# Patient Record
Sex: FEMALE | Race: WHITE | Employment: UNEMPLOYED | URBAN - METROPOLITAN AREA
[De-identification: names, ages, dates, MRNs, and addresses within clinical notes are randomized per-mention and may not be internally consistent; named-entity substitution may affect disease eponyms.]

---

## 2022-09-08 ENCOUNTER — OFFICE VISIT (OUTPATIENT)
Dept: FAMILY MEDICINE CLINIC | Age: 32
End: 2022-09-08
Payer: COMMERCIAL

## 2022-09-08 VITALS
DIASTOLIC BLOOD PRESSURE: 76 MMHG | OXYGEN SATURATION: 99 % | SYSTOLIC BLOOD PRESSURE: 118 MMHG | BODY MASS INDEX: 30.29 KG/M2 | HEART RATE: 76 BPM | WEIGHT: 181.8 LBS | HEIGHT: 65 IN

## 2022-09-08 DIAGNOSIS — Z12.4 PAP SMEAR FOR CERVICAL CANCER SCREENING: ICD-10-CM

## 2022-09-08 DIAGNOSIS — Z13.29 SCREENING FOR THYROID DISORDER: ICD-10-CM

## 2022-09-08 DIAGNOSIS — G43.909 MIGRAINE WITHOUT STATUS MIGRAINOSUS, NOT INTRACTABLE, UNSPECIFIED MIGRAINE TYPE: ICD-10-CM

## 2022-09-08 DIAGNOSIS — Z11.59 ENCOUNTER FOR HEPATITIS C SCREENING TEST FOR LOW RISK PATIENT: ICD-10-CM

## 2022-09-08 DIAGNOSIS — Z00.00 PREVENTATIVE HEALTH CARE: Primary | ICD-10-CM

## 2022-09-08 DIAGNOSIS — F41.9 ANXIETY AND DEPRESSION: ICD-10-CM

## 2022-09-08 DIAGNOSIS — Z79.899 LONG TERM USE OF DRUG: ICD-10-CM

## 2022-09-08 DIAGNOSIS — Z11.4 ENCOUNTER FOR SCREENING FOR HUMAN IMMUNODEFICIENCY VIRUS (HIV): ICD-10-CM

## 2022-09-08 DIAGNOSIS — F32.A ANXIETY AND DEPRESSION: ICD-10-CM

## 2022-09-08 PROCEDURE — 99385 PREV VISIT NEW AGE 18-39: CPT

## 2022-09-08 PROCEDURE — 36415 COLL VENOUS BLD VENIPUNCTURE: CPT

## 2022-09-08 RX ORDER — AMITRIPTYLINE HYDROCHLORIDE 10 MG/1
10 TABLET, FILM COATED ORAL NIGHTLY
Qty: 30 TABLET | Refills: 0 | Status: SHIPPED | OUTPATIENT
Start: 2022-09-08 | End: 2022-10-06

## 2022-09-08 RX ORDER — ESCITALOPRAM OXALATE 20 MG/1
20 TABLET ORAL DAILY
COMMUNITY
End: 2022-09-08 | Stop reason: SDUPTHER

## 2022-09-08 RX ORDER — ESCITALOPRAM OXALATE 10 MG/1
10 TABLET ORAL DAILY
Qty: 30 TABLET | Refills: 0 | Status: SHIPPED | OUTPATIENT
Start: 2022-09-08 | End: 2022-10-06

## 2022-09-08 RX ORDER — CLINDAMYCIN PHOSPHATE 10 UG/ML
LOTION TOPICAL
COMMUNITY
Start: 2022-09-01

## 2022-09-08 ASSESSMENT — ENCOUNTER SYMPTOMS
COLOR CHANGE: 0
DIARRHEA: 0
CONSTIPATION: 0
BLOOD IN STOOL: 0
ABDOMINAL PAIN: 0
WHEEZING: 0
COUGH: 0
SHORTNESS OF BREATH: 0
SORE THROAT: 0

## 2022-09-08 ASSESSMENT — PATIENT HEALTH QUESTIONNAIRE - PHQ9
SUM OF ALL RESPONSES TO PHQ QUESTIONS 1-9: 0
SUM OF ALL RESPONSES TO PHQ9 QUESTIONS 1 & 2: 0
SUM OF ALL RESPONSES TO PHQ QUESTIONS 1-9: 0
1. LITTLE INTEREST OR PLEASURE IN DOING THINGS: 0
SUM OF ALL RESPONSES TO PHQ QUESTIONS 1-9: 0
2. FEELING DOWN, DEPRESSED OR HOPELESS: 0
SUM OF ALL RESPONSES TO PHQ QUESTIONS 1-9: 0

## 2022-09-08 NOTE — PROGRESS NOTES
Kenny Winter (:  1990) is a 36 y.o. female,New patient, here for evaluation of the following chief complaint(s):  New Patient (Would like to establish care, pt is not fasting today) and Depression (Was on wellbutrin but weaned herself off, currently on lexapro and would like to come off of that, she notes no recent issues )         ASSESSMENT/PLAN:  1. Preventative health care  -Bri Hernandez is a healthy 46-year-old female presents the office today to establish care. Patient states that she has a history of anxiety and depression but she believes that this is well managed. She states that she was on Wellbutrin previously and has since been weaned off. Patient would like to try to wean off of her Lexapro to see if she can do well without medication. Patient recently moved to [place] from [place] she previously was from [place]. She does endorse having some slight allergy symptoms she is currently taking over-the-counter allergy medication  2. Encounter for hepatitis C screening test for low risk patient  -     Hepatitis C Antibody; Future  3. Encounter for screening for human immunodeficiency virus (HIV)  -     HIV Screen; Future  4. Screening for thyroid disorder  -     TSH with Reflex; Future  5. Long term use of drug  -     Basic Metabolic Panel; Future  -     CBC; Future  6. Pap smear for cervical cancer screening  -     Archie Leon DO, Obstetrics, [facility]  7. Anxiety and depression  -Patient was previously on Lexapro 20 mg. Patient is interested in weaning off her medication. We will plan to wean patient off medication at this time. We will have patient take 10 mg starting next week for 1 week proceeded by the next week of taking 5 mg and then stopping the medication. Patient was educated if she begins to have symptoms or feels that she needs the medication again that we can reevaluate and restart her medication. Patient is understanding of this plan.   8. Migraine  -Patient states that she previously suffered from migraines was able to just take abortive medication patient reports that she is having at least 2-3 migraines per day now. She states she believes that this is because after moving to [place] she has had more allergy symptoms that seem to be triggering her migraines. Overall at this time I believe treating her with prophylaxis medication is her best option. We will plan  To start amitriptyline 10 mg p.o. for migraine. Return in about 3 months (around 12/8/2022) for Mood. Subjective   SUBJECTIVE/OBJECTIVE:  HPI  Patient presents today to the office as a new patient. She is here to establish care. Patient states that she recently moved from [place] to [place]. Patient states that she is originally from [place]. 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 Patient has no acute complaints at this visit. Patient is here to establish care. Patient states that she has been on medication for anxiety depression for quite some time. She states that she has been on Lexapro for 6 years she states overall she thinks that her mood is much better and would like to try to wean off of her medication. This is her only concern for this visit. Review of Systems   HENT:  Negative for congestion and sore throat. Respiratory:  Negative for cough, shortness of breath and wheezing. Cardiovascular:  Negative for chest pain and leg swelling. Gastrointestinal:  Negative for abdominal pain, blood in stool, constipation and diarrhea. Endocrine: Negative for cold intolerance and heat intolerance. Genitourinary:  Negative for difficulty urinating, hematuria and urgency. Musculoskeletal:  Negative for arthralgias and gait problem. Skin:  Negative for color change. Neurological:  Negative for dizziness, seizures, light-headedness and headaches. Psychiatric/Behavioral:  Negative for agitation and confusion.  The patient is not nervous/anxious. Objective   Physical Exam  Constitutional:       General: She is not in acute distress. Appearance: Normal appearance. HENT:      Right Ear: Tympanic membrane normal.      Left Ear: Tympanic membrane normal.   Eyes:      Pupils: Pupils are equal, round, and reactive to light. Cardiovascular:      Rate and Rhythm: Normal rate and regular rhythm. Pulmonary:      Effort: Pulmonary effort is normal.      Breath sounds: Normal breath sounds. Abdominal:      General: Abdomen is flat. Bowel sounds are normal.      Palpations: Abdomen is soft. Musculoskeletal:         General: Normal range of motion. Skin:     General: Skin is warm. Neurological:      General: No focal deficit present. Mental Status: She is alert. Psychiatric:         Mood and Affect: Mood normal.         Behavior: Behavior normal.         Judgment: Judgment normal.          This note was generated completely or in part utilizing Dragon dictation speech recognition software. Occasionally, words are mistranscribed and despite editing, the text may contain inaccuracies due to incorrect word recognition. If further clarification is needed please contact the office at [phone]. An electronic signature was used to authenticate this note.     --Desiree Che, APRN - CNP

## 2022-09-09 LAB
ANION GAP SERPL CALCULATED.3IONS-SCNC: 11 MMOL/L (ref 3–16)
BUN BLDV-MCNC: 9 MG/DL (ref 7–20)
CALCIUM SERPL-MCNC: 9.4 MG/DL (ref 8.3–10.6)
CHLORIDE BLD-SCNC: 103 MMOL/L (ref 99–110)
CO2: 25 MMOL/L (ref 21–32)
CREAT SERPL-MCNC: 0.6 MG/DL (ref 0.6–1.1)
GFR AFRICAN AMERICAN: >60
GFR NON-AFRICAN AMERICAN: >60
GLUCOSE BLD-MCNC: 84 MG/DL (ref 70–99)
HCT VFR BLD CALC: 43.3 % (ref 36–48)
HEMOGLOBIN: 15.1 G/DL (ref 12–16)
HEPATITIS C ANTIBODY INTERPRETATION: NORMAL
HIV AG/AB: NORMAL
HIV ANTIGEN: NORMAL
HIV-1 ANTIBODY: NORMAL
HIV-2 AB: NORMAL
MCH RBC QN AUTO: 32.2 PG (ref 26–34)
MCHC RBC AUTO-ENTMCNC: 34.9 G/DL (ref 31–36)
MCV RBC AUTO: 92.5 FL (ref 80–100)
PDW BLD-RTO: 12.7 % (ref 12.4–15.4)
PLATELET # BLD: 234 K/UL (ref 135–450)
PMV BLD AUTO: 8.2 FL (ref 5–10.5)
POTASSIUM SERPL-SCNC: 4.7 MMOL/L (ref 3.5–5.1)
RBC # BLD: 4.68 M/UL (ref 4–5.2)
SODIUM BLD-SCNC: 139 MMOL/L (ref 136–145)
TSH REFLEX: 0.57 UIU/ML (ref 0.27–4.2)
WBC # BLD: 5.4 K/UL (ref 4–11)

## 2022-10-05 DIAGNOSIS — G43.909 MIGRAINE WITHOUT STATUS MIGRAINOSUS, NOT INTRACTABLE, UNSPECIFIED MIGRAINE TYPE: ICD-10-CM

## 2022-10-05 NOTE — TELEPHONE ENCOUNTER
Refill Request       Last Seen: Last Seen Department: 9/8/2022  Last Seen by PCP: 9/8/2022    Last Written: 09/08/2022      Next Appointment:   No future appointments.         Requested Prescriptions     Pending Prescriptions Disp Refills    escitalopram (LEXAPRO) 10 MG tablet [Pharmacy Med Name: ESCITALOPRAM OXALATE 10 MG ORAL TABLET] 30 tablet 0     Sig: TAKE 1 TABLET BY MOUTH DAILY    amitriptyline (ELAVIL) 10 MG tablet [Pharmacy Med Name: AMITRIPTYLINE HCL 10 MG ORAL TABLET] 30 tablet 0     Sig: TAKE 1 TABLET BY MOUTH NIGHTLY

## 2022-10-06 RX ORDER — ESCITALOPRAM OXALATE 10 MG/1
10 TABLET ORAL DAILY
Qty: 30 TABLET | Refills: 0 | Status: SHIPPED | OUTPATIENT
Start: 2022-10-06 | End: 2022-11-05

## 2022-10-06 RX ORDER — AMITRIPTYLINE HYDROCHLORIDE 10 MG/1
10 TABLET, FILM COATED ORAL NIGHTLY
Qty: 30 TABLET | Refills: 0 | Status: SHIPPED | OUTPATIENT
Start: 2022-10-06

## 2023-01-24 RX ORDER — ESCITALOPRAM OXALATE 10 MG/1
10 TABLET ORAL DAILY
Qty: 30 TABLET | Refills: 0 | Status: SHIPPED | OUTPATIENT
Start: 2023-01-24 | End: 2023-01-26 | Stop reason: SDUPTHER

## 2023-01-24 NOTE — TELEPHONE ENCOUNTER
Colletta Schiff [phone] (home)    is requesting refill(s) of medication Lexapro 10mg to preferred pharmacy [address]  9/8/22 (pertaining to medication)   Last refill 10/6/2022 (per medication requested)  Next office visit scheduled or attempted Yes  Date 01/26/23 at 10:30am

## 2023-01-26 ENCOUNTER — OFFICE VISIT (OUTPATIENT)
Dept: FAMILY MEDICINE CLINIC | Age: 33
End: 2023-01-26
Payer: COMMERCIAL

## 2023-01-26 VITALS
HEART RATE: 94 BPM | HEIGHT: 65 IN | DIASTOLIC BLOOD PRESSURE: 84 MMHG | OXYGEN SATURATION: 97 % | BODY MASS INDEX: 30.79 KG/M2 | SYSTOLIC BLOOD PRESSURE: 114 MMHG | WEIGHT: 184.8 LBS | RESPIRATION RATE: 16 BRPM

## 2023-01-26 DIAGNOSIS — F41.9 ANXIETY: Primary | ICD-10-CM

## 2023-01-26 DIAGNOSIS — F33.1 MODERATE EPISODE OF RECURRENT MAJOR DEPRESSIVE DISORDER (HCC): ICD-10-CM

## 2023-01-26 PROCEDURE — 99214 OFFICE O/P EST MOD 30 MIN: CPT

## 2023-01-26 RX ORDER — BUSPIRONE HYDROCHLORIDE 5 MG/1
5 TABLET ORAL 2 TIMES DAILY
Qty: 60 TABLET | Refills: 0 | Status: SHIPPED | OUTPATIENT
Start: 2023-01-26 | End: 2023-02-25

## 2023-01-26 RX ORDER — ESCITALOPRAM OXALATE 10 MG/1
10 TABLET ORAL DAILY
Qty: 30 TABLET | Refills: 0 | Status: SHIPPED | OUTPATIENT
Start: 2023-01-26 | End: 2023-02-25

## 2023-01-26 ASSESSMENT — COLUMBIA-SUICIDE SEVERITY RATING SCALE - C-SSRS
6. HAVE YOU EVER DONE ANYTHING, STARTED TO DO ANYTHING, OR PREPARED TO DO ANYTHING TO END YOUR LIFE?: NO
4. HAVE YOU HAD THESE THOUGHTS AND HAD SOME INTENTION OF ACTING ON THEM?: NO
2. HAVE YOU ACTUALLY HAD ANY THOUGHTS OF KILLING YOURSELF?: YES
5. HAVE YOU STARTED TO WORK OUT OR WORKED OUT THE DETAILS OF HOW TO KILL YOURSELF? DO YOU INTEND TO CARRY OUT THIS PLAN?: NO
3. HAVE YOU BEEN THINKING ABOUT HOW YOU MIGHT KILL YOURSELF?: NO
1. WITHIN THE PAST MONTH, HAVE YOU WISHED YOU WERE DEAD OR WISHED YOU COULD GO TO SLEEP AND NOT WAKE UP?: YES

## 2023-01-26 ASSESSMENT — PATIENT HEALTH QUESTIONNAIRE - PHQ9
SUM OF ALL RESPONSES TO PHQ9 QUESTIONS 1 & 2: 4
3. TROUBLE FALLING OR STAYING ASLEEP: 1
SUM OF ALL RESPONSES TO PHQ QUESTIONS 1-9: 16
4. FEELING TIRED OR HAVING LITTLE ENERGY: 3
9. THOUGHTS THAT YOU WOULD BE BETTER OFF DEAD, OR OF HURTING YOURSELF: 1
8. MOVING OR SPEAKING SO SLOWLY THAT OTHER PEOPLE COULD HAVE NOTICED. OR THE OPPOSITE, BEING SO FIGETY OR RESTLESS THAT YOU HAVE BEEN MOVING AROUND A LOT MORE THAN USUAL: 0
5. POOR APPETITE OR OVEREATING: 1
SUM OF ALL RESPONSES TO PHQ QUESTIONS 1-9: 16
1. LITTLE INTEREST OR PLEASURE IN DOING THINGS: 1
SUM OF ALL RESPONSES TO PHQ QUESTIONS 1-9: 16
6. FEELING BAD ABOUT YOURSELF - OR THAT YOU ARE A FAILURE OR HAVE LET YOURSELF OR YOUR FAMILY DOWN: 3
2. FEELING DOWN, DEPRESSED OR HOPELESS: 3
7. TROUBLE CONCENTRATING ON THINGS, SUCH AS READING THE NEWSPAPER OR WATCHING TELEVISION: 3
10. IF YOU CHECKED OFF ANY PROBLEMS, HOW DIFFICULT HAVE THESE PROBLEMS MADE IT FOR YOU TO DO YOUR WORK, TAKE CARE OF THINGS AT HOME, OR GET ALONG WITH OTHER PEOPLE: 2
SUM OF ALL RESPONSES TO PHQ QUESTIONS 1-9: 15

## 2023-01-26 ASSESSMENT — ENCOUNTER SYMPTOMS
SHORTNESS OF BREATH: 0
DIARRHEA: 0
SORE THROAT: 0
COLOR CHANGE: 0
CONSTIPATION: 0
ABDOMINAL PAIN: 0
COUGH: 0
BLOOD IN STOOL: 0
WHEEZING: 0

## 2023-01-26 NOTE — PROGRESS NOTES
Elyse Dunaway (:  1990) is a 29 y.o. female,Established patient, here for evaluation of the following chief complaint(s): Anxiety (Pt is here for 3 month follow up, has started a new job which is stressful, started meds back again last week ) and Depression         ASSESSMENT/PLAN:  1. Anxiety  -     escitalopram (LEXAPRO) 10 MG tablet; Take 1 tablet by mouth daily, Disp-30 tablet, R-0Normal  -     busPIRone (BUSPAR) 5 MG tablet; Take 1 tablet by mouth 2 times daily, Disp-60 tablet, R-0Normal  2. Moderate episode of recurrent major depressive disorder (HCC)  -     escitalopram (LEXAPRO) 10 MG tablet; Take 1 tablet by mouth daily, Disp-30 tablet, R-0Normal  -     busPIRone (BUSPAR) 5 MG tablet; Take 1 tablet by mouth 2 times daily, Disp-60 tablet, R-0Normal  -Patient self restarted Lexapro at 10 mg. Did encourage patient to continue Lexapro. Patient states Lexapro did well for her in the past.  Patient does report having some improvement in symptoms. Patient is still suffering from some severe anxiety causing panic attacks related to her current role. Plan at this time is to add buspirone to her medication regimen. Patient was educated on side effects of medication. Patient will continue to take Lexapro for her depression. Did have plan on increasing Lexapro in 2 to 3 weeks if patient's symptoms have still not improved. Patient was again educated on side effects of Lexapro. Patient was also educated if she has worsening thoughts of harming herself or others were to seek emergency care. Patient was encouraged to call the office for worsening or more intrusive thoughts that previously had. Patient is agreeable and understands plan at this time. Did spend time educating patient on other alternative medications of Lexapro does not improve her symptoms. Patient is agreeable and understands plan at this time we will continue to follow and evaluate patient's mood.   Again patient was educated if any worsening thoughts of harming her self or others to seek emergent care. Patient is agreeable and understands. Return in about 4 weeks (around 2/23/2023) for Mood . Subjective   SUBJECTIVE/OBJECTIVE:  HPI  Patient presents to the office today for follow-up regarding her anxiety and depression. Patient states that her anxiety and depression have not been very well managed over the last few weeks. Patient recently started a new job and is working in a very stressful environment. She reports working 7 days a week with very high intensity of stress. Patient was previously doing well without her Lexapro. Patient was able to wean off of Lexapro. Patient states that she began taking her Lexapro last week because of worsening mood. Patient does endorse some intrusive thoughts of harming herself. Patient does have a past attempt in 2017 and since then patient states that she has been very good with no thoughts of harming her self or others. Patient denies any plan. Patient denies any desire to hurt herself. She states that the thoughts are often easily ignored and go away. Patient states that she has restarted her Lexapro and is seeing improvements in her overall mood. Patient does endorse still having some significant anxiety related to her new role. She states she frequently has panic attacks. She states that she is very anxious about edge most of the time due to her job. Again patient denied any thoughts of harming herself or plans of harming herself or others. Patient has no other acute complaints at this time    Review of Systems   HENT:  Negative for congestion and sore throat. Respiratory:  Negative for cough, shortness of breath and wheezing. Cardiovascular:  Negative for chest pain and leg swelling. Gastrointestinal:  Negative for abdominal pain, blood in stool, constipation and diarrhea. Endocrine: Negative for cold intolerance and heat intolerance.    Genitourinary:  Negative for difficulty urinating, hematuria and urgency. Musculoskeletal:  Negative for arthralgias and gait problem. Skin:  Negative for color change. Neurological:  Negative for dizziness, seizures, light-headedness and headaches. Psychiatric/Behavioral:  Negative for agitation and confusion. The patient is not nervous/anxious. Objective   Physical Exam  Vitals reviewed. Constitutional:       General: She is not in acute distress. Appearance: Normal appearance. She is obese. HENT:      Right Ear: Tympanic membrane normal.      Left Ear: Tympanic membrane normal.   Eyes:      Pupils: Pupils are equal, round, and reactive to light. Cardiovascular:      Rate and Rhythm: Normal rate and regular rhythm. Pulmonary:      Effort: Pulmonary effort is normal.      Breath sounds: Normal breath sounds. Abdominal:      General: Abdomen is flat. Bowel sounds are normal.      Palpations: Abdomen is soft. Musculoskeletal:         General: Normal range of motion. Skin:     General: Skin is warm. Neurological:      General: No focal deficit present. Mental Status: She is alert. Psychiatric:         Mood and Affect: Mood is anxious and depressed. Behavior: Behavior normal.         Judgment: Judgment normal.          This note was generated completely or in part utilizing Dragon dictation speech recognition software. Occasionally, words are mistranscribed and despite editing, the text may contain inaccuracies due to incorrect word recognition. If further clarification is needed please contact the office at [phone]. An electronic signature was used to authenticate this note.     --ISMAEL Vieyra - CNP

## 2023-02-23 ENCOUNTER — OFFICE VISIT (OUTPATIENT)
Dept: FAMILY MEDICINE CLINIC | Age: 33
End: 2023-02-23

## 2023-02-23 VITALS
WEIGHT: 186.8 LBS | HEART RATE: 90 BPM | DIASTOLIC BLOOD PRESSURE: 82 MMHG | BODY MASS INDEX: 31.12 KG/M2 | SYSTOLIC BLOOD PRESSURE: 108 MMHG | OXYGEN SATURATION: 99 % | HEIGHT: 65 IN | RESPIRATION RATE: 16 BRPM

## 2023-02-23 DIAGNOSIS — R21 BUTTERFLY RASH: ICD-10-CM

## 2023-02-23 DIAGNOSIS — Z82.69 FAMILY HISTORY OF SYSTEMIC LUPUS ERYTHEMATOSUS (SLE) IN MOTHER: ICD-10-CM

## 2023-02-23 DIAGNOSIS — F33.1 MODERATE EPISODE OF RECURRENT MAJOR DEPRESSIVE DISORDER (HCC): Primary | ICD-10-CM

## 2023-02-23 LAB
C-REACTIVE PROTEIN: <3 MG/L (ref 0–5.1)
SEDIMENTATION RATE, ERYTHROCYTE: 11 MM/HR (ref 0–20)

## 2023-02-23 RX ORDER — VENLAFAXINE HYDROCHLORIDE 37.5 MG/1
37.5 CAPSULE, EXTENDED RELEASE ORAL DAILY
Qty: 30 CAPSULE | Refills: 0 | Status: SHIPPED | OUTPATIENT
Start: 2023-02-23 | End: 2023-03-25

## 2023-02-23 ASSESSMENT — ENCOUNTER SYMPTOMS
SORE THROAT: 0
ABDOMINAL PAIN: 0
DIARRHEA: 0
COUGH: 0
COLOR CHANGE: 0
BLOOD IN STOOL: 0
WHEEZING: 0
CONSTIPATION: 0
SHORTNESS OF BREATH: 0

## 2023-02-23 NOTE — PROGRESS NOTES
Malka Segal (:  1990) is a 29 y.o. female,Established patient, here for evaluation of the following chief complaint(s): Anxiety (Pt is here for a 4 week follow up of anxiety, is doing better, ) and Lupus (Has a family history of Lupus, would like to be checked, always has red cheeks, )         ASSESSMENT/PLAN:  1. Moderate episode of recurrent major depressive disorder (HCC)  -     venlafaxine (EFFEXOR XR) 37.5 MG extended release capsule; Take 1 capsule by mouth daily, Disp-30 capsule, R-0Normal  - Patient did have some weight gain on Lexapro. She states that it was not controlling her symptoms as well as she would like. Plan at this time is to switch to Effexor. Patient was educated on side effects of medication. Patient was educated that if she has worsening thoughts of harming her self or others to seek care right away. Patient is agreeable and understands plan at this time. 2. Family history of systemic lupus erythematosus (SLE) in mother  -     ANH Reflex to Antibody Cascade; Future  -     Sedimentation Rate; Future  -     C-Reactive Protein; Future  3. Butterfly rash  -     ANH Reflex to Antibody Cascade; Future  -     Sedimentation Rate; Future  -     C-Reactive Protein; Future  -Patient has a family history of lupus her mother currently has lupus and Sjogren's. Patient does have a butterfly rash she saw dermatology which told her it was rosacea. She states that she is trying the medications that dermatology gave her with minimal relief of symptoms. She states that these things have gotten worse over the last few years. We will plan to further test patient given patient's history and presentation. Patient agreeable and understands plan at this time    Return in about 3 months (around 2023) for Mood . Subjective   SUBJECTIVE/OBJECTIVE:  HPI  Patient presents the office today for follow-up regarding mood.   During last visit patient was placed on Lexapro 10 mg as well as buspirone 5 mg to help manage anxiety.  Patient endorses going through a lot of life stressors.  Patient states that she recently started a new job.  She states that she also recently moved to the area.  Patient states he works in a very stressful job.  She states that she is previously on Lexapro but ultimately stopped taking her medication.  Patient states since starting medication she does endorse having reduction in anxiety like symptoms.  She states that overall she is doing well with minimal side effects of medication.  She states that it does help overall manage her mood and anxiety like symptoms.  Patient does have some concern for weight gain.  Patient does also have some slight sexual dysfunction.  Patient denies any thoughts of harming herself or others.  Overall patient states that she believes the medication is okay but does not always manage her symptoms.  Patient has no other acute concerns at this time.    Review of Systems   HENT:  Negative for congestion and sore throat.    Respiratory:  Negative for cough, shortness of breath and wheezing.    Cardiovascular:  Negative for chest pain and leg swelling.   Gastrointestinal:  Negative for abdominal pain, blood in stool, constipation and diarrhea.   Endocrine: Negative for cold intolerance and heat intolerance.   Genitourinary:  Negative for difficulty urinating, hematuria and urgency.   Musculoskeletal:  Negative for arthralgias and gait problem.   Skin:  Negative for color change.   Neurological:  Negative for dizziness, seizures, light-headedness and headaches.   Psychiatric/Behavioral:  Negative for agitation and confusion. The patient is not nervous/anxious.         Objective   Physical Exam  Vitals reviewed.   Constitutional:       General: She is not in acute distress.     Appearance: Normal appearance. She is obese.   HENT:      Right Ear: Tympanic membrane normal.      Left Ear: Tympanic membrane normal.   Eyes:      Pupils: Pupils are equal,  round, and reactive to light. Cardiovascular:      Rate and Rhythm: Normal rate and regular rhythm. Pulmonary:      Effort: Pulmonary effort is normal.      Breath sounds: Normal breath sounds. Abdominal:      General: Abdomen is flat. Bowel sounds are normal.      Palpations: Abdomen is soft. Musculoskeletal:         General: Normal range of motion. Skin:     General: Skin is warm. Neurological:      General: No focal deficit present. Mental Status: She is alert. Psychiatric:         Mood and Affect: Mood normal.         Behavior: Behavior normal.         Judgment: Judgment normal.          This note was generated completely or in part utilizing Dragon dictation speech recognition software. Occasionally, words are mistranscribed and despite editing, the text may contain inaccuracies due to incorrect word recognition. If further clarification is needed please contact the office at [phone]. An electronic signature was used to authenticate this note.     --ISMAEL Frederick - CNP

## 2023-02-24 LAB — ANTI-NUCLEAR ANTIBODY (ANA): NEGATIVE

## 2023-02-27 ENCOUNTER — TELEPHONE (OUTPATIENT)
Dept: FAMILY MEDICINE CLINIC | Age: 33
End: 2023-02-27

## 2023-05-12 DIAGNOSIS — F33.1 MODERATE EPISODE OF RECURRENT MAJOR DEPRESSIVE DISORDER (HCC): ICD-10-CM

## 2023-05-15 RX ORDER — VENLAFAXINE HYDROCHLORIDE 37.5 MG/1
37.5 CAPSULE, EXTENDED RELEASE ORAL DAILY
Qty: 30 CAPSULE | Refills: 1 | Status: SHIPPED | OUTPATIENT
Start: 2023-05-15 | End: 2023-06-14

## 2024-06-01 NOTE — TELEPHONE ENCOUNTER
Patient called about results, was informed, and did not have any other questions at this time.
1 = Total assistance